# Patient Record
Sex: FEMALE | Race: WHITE | NOT HISPANIC OR LATINO | URBAN - METROPOLITAN AREA
[De-identification: names, ages, dates, MRNs, and addresses within clinical notes are randomized per-mention and may not be internally consistent; named-entity substitution may affect disease eponyms.]

---

## 2023-12-30 ENCOUNTER — OFFICE VISIT (OUTPATIENT)
Dept: URGENT CARE | Facility: CLINIC | Age: 28
End: 2023-12-30
Payer: COMMERCIAL

## 2023-12-30 VITALS
OXYGEN SATURATION: 98 % | HEART RATE: 77 BPM | WEIGHT: 185 LBS | SYSTOLIC BLOOD PRESSURE: 123 MMHG | DIASTOLIC BLOOD PRESSURE: 90 MMHG | RESPIRATION RATE: 20 BRPM | TEMPERATURE: 98.1 F

## 2023-12-30 DIAGNOSIS — M25.542 ARTHRALGIA OF LEFT HAND: Primary | ICD-10-CM

## 2023-12-30 PROCEDURE — 99204 OFFICE O/P NEW MOD 45 MIN: CPT | Performed by: PHYSICIAN ASSISTANT

## 2023-12-30 RX ORDER — METHYLPREDNISOLONE 4 MG/1
TABLET ORAL
Qty: 21 TABLET | Refills: 0 | Status: SHIPPED | OUTPATIENT
Start: 2023-12-30 | End: 2024-01-06

## 2023-12-30 RX ORDER — BUPROPION HYDROCHLORIDE 300 MG/1
300 TABLET ORAL DAILY
COMMUNITY

## 2023-12-30 ASSESSMENT — ENCOUNTER SYMPTOMS
WOUND: 0
COLOR CHANGE: 0
RESPIRATORY NEGATIVE: 1
ALLERGIC/IMMUNOLOGIC NEGATIVE: 1
EYES NEGATIVE: 1
CARDIOVASCULAR NEGATIVE: 1
ARTHRALGIAS: 1
HEMATOLOGIC/LYMPHATIC NEGATIVE: 1
NUMBNESS: 0
GASTROINTESTINAL NEGATIVE: 1
PSYCHIATRIC NEGATIVE: 1
WEAKNESS: 0
FEVER: 0
ENDOCRINE NEGATIVE: 1

## 2023-12-30 ASSESSMENT — PAIN SCALES - GENERAL: PAINLEVEL: 6

## 2023-12-30 NOTE — PROGRESS NOTES
Subjective   Patient ID: Felipa Morocho is a 28 y.o. female.      History provided by:  Patient   used: No    Hand Pain  Associated symptoms: no fever    This is a 28 yr old female here for left hand swelling and pain across knuckles x 5 days. No injury or trauma to the hand. No fever, parasthesias, weakness, open wound or skin erythema.     Review of Systems   Constitutional:  Negative for fever.   HENT: Negative.     Eyes: Negative.    Respiratory: Negative.     Cardiovascular: Negative.    Gastrointestinal: Negative.    Endocrine: Negative.    Genitourinary: Negative.    Musculoskeletal:  Positive for arthralgias.   Skin:  Negative for color change and wound.   Allergic/Immunologic: Negative.    Neurological:  Negative for weakness and numbness.   Hematological: Negative.    Psychiatric/Behavioral: Negative.     All other systems reviewed and are negative.  /90   Pulse 77   Temp 36.7 °C (98.1 °F)   Resp 20   Wt 83.9 kg (185 lb)   SpO2 98%     Objective   Physical Exam  Vitals and nursing note reviewed.   Constitutional:       Appearance: Normal appearance.   HENT:      Head: Normocephalic and atraumatic.   Cardiovascular:      Rate and Rhythm: Normal rate and regular rhythm.   Pulmonary:      Effort: Pulmonary effort is normal.      Breath sounds: Normal breath sounds.   Musculoskeletal:         General: No signs of injury.      Comments: Left hand-pain with palpation across MCP jt mid hand, no significant edema, skin erythema or open wound seen. Limited FROM secondary to pain, distal n-v intact   Skin:     General: Skin is warm and dry.   Neurological:      General: No focal deficit present.      Mental Status: She is alert and oriented to person, place, and time.   Psychiatric:         Mood and Affect: Mood normal.         Behavior: Behavior normal.     Assessment:  Left hand arthralgia    Plan:  Medrol dose gloria taper as directed  Ice and elevation  Pcp follow up this week if  not improving or worsening  ER visit anytime 24/7 for acute worsening or changing condition

## 2023-12-30 NOTE — PATIENT INSTRUCTIONS
Ice and elevate hand as needed for pain/swelling  Pcp follow up this week if not improving or worsening  ER visit anytime 24/7 for acute worsening or changing condition